# Patient Record
Sex: FEMALE | URBAN - METROPOLITAN AREA
[De-identification: names, ages, dates, MRNs, and addresses within clinical notes are randomized per-mention and may not be internally consistent; named-entity substitution may affect disease eponyms.]

---

## 2019-07-05 PROBLEM — 70153002 HEMORRHOIDS: Status: ACTIVE | Noted: 2019-07-05

## 2019-07-05 PROBLEM — 14760008 CONSTIPATION: Status: ACTIVE | Noted: 2019-07-05

## 2019-07-05 PROBLEM — 275978004 SCREENING FOR MALIGNANT NEOPLASM OF COLON: Status: ACTIVE | Noted: 2019-07-05

## 2022-04-30 ENCOUNTER — TELEPHONE ENCOUNTER (OUTPATIENT)
Dept: URBAN - METROPOLITAN AREA CLINIC 121 | Facility: CLINIC | Age: 67
End: 2022-04-30

## 2022-05-01 ENCOUNTER — TELEPHONE ENCOUNTER (OUTPATIENT)
Dept: URBAN - METROPOLITAN AREA CLINIC 121 | Facility: CLINIC | Age: 67
End: 2022-05-01

## 2022-05-01 RX ORDER — ASPIRIN 81 MG/1
QD TABLET, DELAYED RELEASE ORAL
Status: ACTIVE | COMMUNITY
Start: 2019-07-05

## 2022-05-01 RX ORDER — LISINOPRIL 5 MG/1
QD TABLET ORAL
Status: ACTIVE | COMMUNITY
Start: 2019-07-05

## 2022-05-01 RX ORDER — METFORMIN HCL 1000 MG/1
QD TABLET ORAL
Status: ACTIVE | COMMUNITY
Start: 2019-07-05

## 2022-05-01 RX ORDER — LINACLOTIDE 290 UG/1
TAKE 1 CAPSULE PO QD CAPSULE, GELATIN COATED ORAL
Status: ACTIVE | COMMUNITY
Start: 2019-08-14

## 2022-05-01 RX ORDER — AMLODIPINE AND BENAZEPRIL HYDROCHLORIDE 5; 20 MG/1; MG/1
QD CAPSULE ORAL
Status: ACTIVE | COMMUNITY
Start: 2019-07-05

## 2022-05-01 RX ORDER — SODIUM SULFATE, POTASSIUM SULFATE, MAGNESIUM SULFATE 17.5; 3.13; 1.6 G/ML; G/ML; G/ML
MIX AND USE AS DIRECTED SOLUTION, CONCENTRATE ORAL
Status: ACTIVE | COMMUNITY
Start: 2019-07-05

## 2024-06-12 ENCOUNTER — OFFICE VISIT (OUTPATIENT)
Dept: URBAN - METROPOLITAN AREA CLINIC 27 | Facility: CLINIC | Age: 69
End: 2024-06-12

## 2024-07-09 ENCOUNTER — OFFICE VISIT (OUTPATIENT)
Dept: URBAN - METROPOLITAN AREA CLINIC 29 | Facility: CLINIC | Age: 69
End: 2024-07-09

## 2024-08-06 ENCOUNTER — DASHBOARD ENCOUNTERS (OUTPATIENT)
Age: 69
End: 2024-08-06

## 2024-08-06 ENCOUNTER — OFFICE VISIT (OUTPATIENT)
Dept: URBAN - METROPOLITAN AREA CLINIC 29 | Facility: CLINIC | Age: 69
End: 2024-08-06
Payer: MEDICARE

## 2024-08-06 VITALS
DIASTOLIC BLOOD PRESSURE: 85 MMHG | WEIGHT: 205 LBS | HEIGHT: 68 IN | HEART RATE: 95 BPM | BODY MASS INDEX: 31.07 KG/M2 | SYSTOLIC BLOOD PRESSURE: 140 MMHG

## 2024-08-06 DIAGNOSIS — K59.04 CHRONIC IDIOPATHIC CONSTIPATION: ICD-10-CM

## 2024-08-06 DIAGNOSIS — Z12.11 COLON CANCER SCREENING: ICD-10-CM

## 2024-08-06 PROCEDURE — 99203 OFFICE O/P NEW LOW 30 MIN: CPT | Performed by: INTERNAL MEDICINE

## 2024-08-06 RX ORDER — AMLODIPINE AND BENAZEPRIL HYDROCHLORIDE 5; 20 MG/1; MG/1
QD CAPSULE ORAL
Status: ACTIVE | COMMUNITY
Start: 2019-07-05

## 2024-08-06 RX ORDER — LINACLOTIDE 290 UG/1
TAKE 1 CAPSULE PO QD CAPSULE, GELATIN COATED ORAL
Status: ACTIVE | COMMUNITY
Start: 2019-08-14

## 2024-08-06 RX ORDER — LISINOPRIL 5 MG/1
QD TABLET ORAL
Status: ACTIVE | COMMUNITY
Start: 2019-07-05

## 2024-08-06 RX ORDER — METFORMIN HCL 1000 MG/1
QD TABLET ORAL
Status: ACTIVE | COMMUNITY
Start: 2019-07-05

## 2024-08-06 RX ORDER — SODIUM SULFATE, POTASSIUM SULFATE, MAGNESIUM SULFATE 17.5; 3.13; 1.6 G/ML; G/ML; G/ML
MIX AND USE AS DIRECTED SOLUTION, CONCENTRATE ORAL
Status: ACTIVE | COMMUNITY
Start: 2019-07-05

## 2024-08-06 RX ORDER — ASPIRIN 81 MG/1
QD TABLET, DELAYED RELEASE ORAL
Status: ACTIVE | COMMUNITY
Start: 2019-07-05

## 2024-08-06 NOTE — HPI-TODAY'S VISIT:
Ms. Cooley is a 69-year-old woman who presents today per request by Dr. Means for colon cancer screening.  She  had a colonoscopy 5 year ago significant for a hyperplastic polyp. She does not  have any family history of colon cancer or polyps.  She does not currently have any GI problems. She has a bowel movement every 2-3 days.